# Patient Record
Sex: MALE | Race: BLACK OR AFRICAN AMERICAN | NOT HISPANIC OR LATINO | URBAN - METROPOLITAN AREA
[De-identification: names, ages, dates, MRNs, and addresses within clinical notes are randomized per-mention and may not be internally consistent; named-entity substitution may affect disease eponyms.]

---

## 2023-11-05 ENCOUNTER — INPATIENT (INPATIENT)
Facility: HOSPITAL | Age: 43
LOS: 2 days | Discharge: HOME | DRG: 472 | End: 2023-11-08
Attending: NEUROLOGICAL SURGERY | Admitting: NEUROLOGICAL SURGERY
Payer: COMMERCIAL

## 2023-11-05 VITALS
DIASTOLIC BLOOD PRESSURE: 81 MMHG | RESPIRATION RATE: 18 BRPM | HEART RATE: 78 BPM | TEMPERATURE: 97 F | WEIGHT: 179.9 LBS | SYSTOLIC BLOOD PRESSURE: 136 MMHG | OXYGEN SATURATION: 98 %

## 2023-11-05 DIAGNOSIS — Z98.890 OTHER SPECIFIED POSTPROCEDURAL STATES: Chronic | ICD-10-CM

## 2023-11-05 LAB
ANION GAP SERPL CALC-SCNC: 7 MMOL/L — SIGNIFICANT CHANGE UP (ref 5–17)
ANION GAP SERPL CALC-SCNC: 7 MMOL/L — SIGNIFICANT CHANGE UP (ref 5–17)
APTT BLD: 29.6 SEC — SIGNIFICANT CHANGE UP (ref 24.5–35.6)
APTT BLD: 29.6 SEC — SIGNIFICANT CHANGE UP (ref 24.5–35.6)
BASOPHILS # BLD AUTO: 0.02 K/UL — SIGNIFICANT CHANGE UP (ref 0–0.2)
BASOPHILS # BLD AUTO: 0.02 K/UL — SIGNIFICANT CHANGE UP (ref 0–0.2)
BASOPHILS NFR BLD AUTO: 0.4 % — SIGNIFICANT CHANGE UP (ref 0–2)
BASOPHILS NFR BLD AUTO: 0.4 % — SIGNIFICANT CHANGE UP (ref 0–2)
BLD GP AB SCN SERPL QL: NEGATIVE — SIGNIFICANT CHANGE UP
BLD GP AB SCN SERPL QL: NEGATIVE — SIGNIFICANT CHANGE UP
BUN SERPL-MCNC: 10 MG/DL — SIGNIFICANT CHANGE UP (ref 7–23)
BUN SERPL-MCNC: 10 MG/DL — SIGNIFICANT CHANGE UP (ref 7–23)
CALCIUM SERPL-MCNC: 9.8 MG/DL — SIGNIFICANT CHANGE UP (ref 8.4–10.5)
CALCIUM SERPL-MCNC: 9.8 MG/DL — SIGNIFICANT CHANGE UP (ref 8.4–10.5)
CHLORIDE SERPL-SCNC: 104 MMOL/L — SIGNIFICANT CHANGE UP (ref 96–108)
CHLORIDE SERPL-SCNC: 104 MMOL/L — SIGNIFICANT CHANGE UP (ref 96–108)
CO2 SERPL-SCNC: 29 MMOL/L — SIGNIFICANT CHANGE UP (ref 22–31)
CO2 SERPL-SCNC: 29 MMOL/L — SIGNIFICANT CHANGE UP (ref 22–31)
CREAT SERPL-MCNC: 0.65 MG/DL — SIGNIFICANT CHANGE UP (ref 0.5–1.3)
CREAT SERPL-MCNC: 0.65 MG/DL — SIGNIFICANT CHANGE UP (ref 0.5–1.3)
EGFR: 118 ML/MIN/1.73M2 — SIGNIFICANT CHANGE UP
EGFR: 118 ML/MIN/1.73M2 — SIGNIFICANT CHANGE UP
EOSINOPHIL # BLD AUTO: 0.11 K/UL — SIGNIFICANT CHANGE UP (ref 0–0.5)
EOSINOPHIL # BLD AUTO: 0.11 K/UL — SIGNIFICANT CHANGE UP (ref 0–0.5)
EOSINOPHIL NFR BLD AUTO: 2 % — SIGNIFICANT CHANGE UP (ref 0–6)
EOSINOPHIL NFR BLD AUTO: 2 % — SIGNIFICANT CHANGE UP (ref 0–6)
GLUCOSE SERPL-MCNC: 99 MG/DL — SIGNIFICANT CHANGE UP (ref 70–99)
GLUCOSE SERPL-MCNC: 99 MG/DL — SIGNIFICANT CHANGE UP (ref 70–99)
HCT VFR BLD CALC: 39 % — SIGNIFICANT CHANGE UP (ref 39–50)
HCT VFR BLD CALC: 39 % — SIGNIFICANT CHANGE UP (ref 39–50)
HGB BLD-MCNC: 13.4 G/DL — SIGNIFICANT CHANGE UP (ref 13–17)
HGB BLD-MCNC: 13.4 G/DL — SIGNIFICANT CHANGE UP (ref 13–17)
IMM GRANULOCYTES NFR BLD AUTO: 0.2 % — SIGNIFICANT CHANGE UP (ref 0–0.9)
IMM GRANULOCYTES NFR BLD AUTO: 0.2 % — SIGNIFICANT CHANGE UP (ref 0–0.9)
INR BLD: 1.07 — SIGNIFICANT CHANGE UP (ref 0.85–1.18)
INR BLD: 1.07 — SIGNIFICANT CHANGE UP (ref 0.85–1.18)
LYMPHOCYTES # BLD AUTO: 1.89 K/UL — SIGNIFICANT CHANGE UP (ref 1–3.3)
LYMPHOCYTES # BLD AUTO: 1.89 K/UL — SIGNIFICANT CHANGE UP (ref 1–3.3)
LYMPHOCYTES # BLD AUTO: 35.1 % — SIGNIFICANT CHANGE UP (ref 13–44)
LYMPHOCYTES # BLD AUTO: 35.1 % — SIGNIFICANT CHANGE UP (ref 13–44)
MCHC RBC-ENTMCNC: 29.8 PG — SIGNIFICANT CHANGE UP (ref 27–34)
MCHC RBC-ENTMCNC: 29.8 PG — SIGNIFICANT CHANGE UP (ref 27–34)
MCHC RBC-ENTMCNC: 34.4 GM/DL — SIGNIFICANT CHANGE UP (ref 32–36)
MCHC RBC-ENTMCNC: 34.4 GM/DL — SIGNIFICANT CHANGE UP (ref 32–36)
MCV RBC AUTO: 86.9 FL — SIGNIFICANT CHANGE UP (ref 80–100)
MCV RBC AUTO: 86.9 FL — SIGNIFICANT CHANGE UP (ref 80–100)
MONOCYTES # BLD AUTO: 0.36 K/UL — SIGNIFICANT CHANGE UP (ref 0–0.9)
MONOCYTES # BLD AUTO: 0.36 K/UL — SIGNIFICANT CHANGE UP (ref 0–0.9)
MONOCYTES NFR BLD AUTO: 6.7 % — SIGNIFICANT CHANGE UP (ref 2–14)
MONOCYTES NFR BLD AUTO: 6.7 % — SIGNIFICANT CHANGE UP (ref 2–14)
NEUTROPHILS # BLD AUTO: 3 K/UL — SIGNIFICANT CHANGE UP (ref 1.8–7.4)
NEUTROPHILS # BLD AUTO: 3 K/UL — SIGNIFICANT CHANGE UP (ref 1.8–7.4)
NEUTROPHILS NFR BLD AUTO: 55.6 % — SIGNIFICANT CHANGE UP (ref 43–77)
NEUTROPHILS NFR BLD AUTO: 55.6 % — SIGNIFICANT CHANGE UP (ref 43–77)
NRBC # BLD: 0 /100 WBCS — SIGNIFICANT CHANGE UP (ref 0–0)
NRBC # BLD: 0 /100 WBCS — SIGNIFICANT CHANGE UP (ref 0–0)
PLATELET # BLD AUTO: 208 K/UL — SIGNIFICANT CHANGE UP (ref 150–400)
PLATELET # BLD AUTO: 208 K/UL — SIGNIFICANT CHANGE UP (ref 150–400)
POTASSIUM SERPL-MCNC: 4.2 MMOL/L — SIGNIFICANT CHANGE UP (ref 3.5–5.3)
POTASSIUM SERPL-MCNC: 4.2 MMOL/L — SIGNIFICANT CHANGE UP (ref 3.5–5.3)
POTASSIUM SERPL-SCNC: 4.2 MMOL/L — SIGNIFICANT CHANGE UP (ref 3.5–5.3)
POTASSIUM SERPL-SCNC: 4.2 MMOL/L — SIGNIFICANT CHANGE UP (ref 3.5–5.3)
PROTHROM AB SERPL-ACNC: 12.2 SEC — SIGNIFICANT CHANGE UP (ref 9.5–13)
PROTHROM AB SERPL-ACNC: 12.2 SEC — SIGNIFICANT CHANGE UP (ref 9.5–13)
RBC # BLD: 4.49 M/UL — SIGNIFICANT CHANGE UP (ref 4.2–5.8)
RBC # BLD: 4.49 M/UL — SIGNIFICANT CHANGE UP (ref 4.2–5.8)
RBC # FLD: 11.8 % — SIGNIFICANT CHANGE UP (ref 10.3–14.5)
RBC # FLD: 11.8 % — SIGNIFICANT CHANGE UP (ref 10.3–14.5)
RH IG SCN BLD-IMP: POSITIVE — SIGNIFICANT CHANGE UP
SODIUM SERPL-SCNC: 140 MMOL/L — SIGNIFICANT CHANGE UP (ref 135–145)
SODIUM SERPL-SCNC: 140 MMOL/L — SIGNIFICANT CHANGE UP (ref 135–145)
WBC # BLD: 5.39 K/UL — SIGNIFICANT CHANGE UP (ref 3.8–10.5)
WBC # BLD: 5.39 K/UL — SIGNIFICANT CHANGE UP (ref 3.8–10.5)
WBC # FLD AUTO: 5.39 K/UL — SIGNIFICANT CHANGE UP (ref 3.8–10.5)
WBC # FLD AUTO: 5.39 K/UL — SIGNIFICANT CHANGE UP (ref 3.8–10.5)

## 2023-11-05 PROCEDURE — 99285 EMERGENCY DEPT VISIT HI MDM: CPT

## 2023-11-05 PROCEDURE — 71045 X-RAY EXAM CHEST 1 VIEW: CPT | Mod: 26

## 2023-11-05 PROCEDURE — 93010 ELECTROCARDIOGRAM REPORT: CPT

## 2023-11-05 PROCEDURE — 72040 X-RAY EXAM NECK SPINE 2-3 VW: CPT | Mod: 26

## 2023-11-05 PROCEDURE — 72125 CT NECK SPINE W/O DYE: CPT | Mod: 26

## 2023-11-05 RX ORDER — SENNA PLUS 8.6 MG/1
2 TABLET ORAL AT BEDTIME
Refills: 0 | Status: DISCONTINUED | OUTPATIENT
Start: 2023-11-05 | End: 2023-11-08

## 2023-11-05 RX ORDER — ACETAMINOPHEN 500 MG
650 TABLET ORAL EVERY 6 HOURS
Refills: 0 | Status: DISCONTINUED | OUTPATIENT
Start: 2023-11-05 | End: 2023-11-08

## 2023-11-05 RX ORDER — POLYETHYLENE GLYCOL 3350 17 G/17G
17 POWDER, FOR SOLUTION ORAL DAILY
Refills: 0 | Status: DISCONTINUED | OUTPATIENT
Start: 2023-11-05 | End: 2023-11-08

## 2023-11-05 RX ORDER — METHOCARBAMOL 500 MG/1
500 TABLET, FILM COATED ORAL EVERY 8 HOURS
Refills: 0 | Status: DISCONTINUED | OUTPATIENT
Start: 2023-11-05 | End: 2023-11-08

## 2023-11-05 RX ADMIN — SENNA PLUS 2 TABLET(S): 8.6 TABLET ORAL at 21:56

## 2023-11-05 RX ADMIN — Medication 100 MILLIGRAM(S): at 21:58

## 2023-11-05 RX ADMIN — Medication 100 MILLIGRAM(S): at 13:25

## 2023-11-05 NOTE — ED ADULT NURSE NOTE - NSFALLUNIVINTERV_ED_ALL_ED
Bed/Stretcher in lowest position, wheels locked, appropriate side rails in place/Call bell, personal items and telephone in reach/Instruct patient to call for assistance before getting out of bed/chair/stretcher/Non-slip footwear applied when patient is off stretcher/[place] to call system/Physically safe environment - no spills, clutter or unnecessary equipment/Purposeful proactive rounding/Room/bathroom lighting operational, light cord in reach
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
## 2023-11-05 NOTE — ED PROVIDER NOTE - CLINICAL SUMMARY MEDICAL DECISION MAKING FREE TEXT BOX
44 yo presenting for pre-op, cervical disc herniation? has MRIs with him, sent by Dr. Osborn. Neuro intact. Pre-op labs ordered. NSGY aware.

## 2023-11-05 NOTE — H&P ADULT - NSHPPHYSICALEXAM_GEN_ALL_CORE
General: NAD, pt is comfortably sitting up in bed, on room air  HEENT: CN II-XII grossly intact, PERRL 3mm briskly reactive, EOMI b/l, face symmetric, tongue midline, neck FROM  Cardiovascular: RRR, normal S1 and S2   Respiratory: lungs CTAB, no wheezing, rhonchi, or crackles   GI: normoactive BS to auscultation, abd soft, NTND   Neuro: A&Ox3, No aphasia, speech clear, no dysmetria, no pronator drift. Follows commands.  GUERIN x4 spontaneously, 5/5 strength in all extremities throughout. +numbness to b/l 4th and 5th fingers and b/l toes. RUE and RLE hyperreflexic, LUE and LLE normoreflexic, +b/l Ly sign  Extremities: distal pulses 2+ x4

## 2023-11-05 NOTE — H&P ADULT - NSVTERISKREFERASSESS_GEN_ALL_CORE
Does the Patient have a central line?  Yes: See Invasive (Oncology) Lines Flowsheet for CVAD documentation.      Refer to the Assessment tab to view/cancel completed assessment.

## 2023-11-05 NOTE — ED ADULT TRIAGE NOTE - CHIEF COMPLAINT QUOTE
" I am here because Dr pamela mishra ([phone]) sent me for tests for my operation." hx cervical herniation w difficulty walking. denies any stool/urine incontinence.

## 2023-11-05 NOTE — ED ADULT NURSE NOTE - CHIEF COMPLAINT QUOTE
" I am here because Dr pamela mishra ([phone]) sent me for tests for my operation." hx cervical herniation w difficulty walking. denies any stool/urine incontinence.

## 2023-11-05 NOTE — PATIENT PROFILE ADULT - FALL HARM RISK - HARM RISK INTERVENTIONS
Assistance with ambulation/Assistance OOB with selected safe patient handling equipment/Communicate Risk of Fall with Harm to all staff/Discuss with provider need for PT consult/Monitor gait and stability/Reinforce activity limits and safety measures with patient and family/Tailored Fall Risk Interventions/Visual Cue: Yellow wristband and red socks/Bed in lowest position, wheels locked, appropriate side rails in place/Call bell, personal items and telephone in reach/Instruct patient to call for assistance before getting out of bed or chair/Non-slip footwear when patient is out of bed/[place] to call system/Physically safe environment - no spills, clutter or unnecessary equipment/Purposeful Proactive Rounding/Room/bathroom lighting operational, light cord in reach

## 2023-11-05 NOTE — ED PROVIDER NOTE - OBJECTIVE STATEMENT
43 year old male with ho cervical disc herniation per patient presenting at request of Dr. Osborn for possible pre-op clearance. Pt is visiting from [facility]. Had MRIs done of C and T spine there, has discs with him. Per patient, he called Dr. Osborn and cervical surgery was mentioned. Pt states he was told to come to ER for pre-op, unclear whether he was told he would have surgery now. Endorsing months of difficulty ambulating. No new numbness, weakness, tingling. No worsening pain. ROS as above.

## 2023-11-05 NOTE — ED PROVIDER NOTE - PHYSICAL EXAMINATION
General: Well appearing, in no acute distress  HEENT: Normocephalic, atraumatic, extraocular movements intact  CV: Regular rate  Pulm: No respiratory distress, no tachypnea  Abd: Flat, no gross distension  Ext: warm and well perfused  Skin: No gross rashes or lesions  Neuro: Alert and oriented, moving all extremities, 5/5 motor bilaterally, sensation intact bilaterally, no midline spinal ttp, CN II-XII intact

## 2023-11-05 NOTE — ED ADULT NURSE NOTE - OBJECTIVE STATEMENT
Patient arrived from front triage for pre-op testing as per pt. Pt denies any other medical complaints at this moment. Pt is noted to be aox3, able to maintain airway, having nonlabored breathing, no retractions noted, non diaphoretic and able to talk in clear full sentences. Pt pending provider eval. Patient arrived from front triage for pre-op testing as per pt. Pt reports this is for his neck and spine due to difficulty walking. Pt denies any other medical complaints at this moment. Pt is noted to be aox3, able to maintain airway, having nonlabored breathing, no retractions noted, non diaphoretic and able to talk in clear full sentences. Pt pending provider eval.

## 2023-11-06 ENCOUNTER — TRANSCRIPTION ENCOUNTER (OUTPATIENT)
Age: 43
End: 2023-11-06

## 2023-11-06 PROCEDURE — 99223 1ST HOSP IP/OBS HIGH 75: CPT

## 2023-11-06 RX ORDER — POVIDONE-IODINE 5 %
1 AEROSOL (ML) TOPICAL ONCE
Refills: 0 | Status: COMPLETED | OUTPATIENT
Start: 2023-11-06 | End: 2023-11-07

## 2023-11-06 RX ORDER — CHLORHEXIDINE GLUCONATE 213 G/1000ML
1 SOLUTION TOPICAL ONCE
Refills: 0 | Status: COMPLETED | OUTPATIENT
Start: 2023-11-06 | End: 2023-11-06

## 2023-11-06 RX ORDER — SODIUM CHLORIDE 9 MG/ML
1000 INJECTION INTRAMUSCULAR; INTRAVENOUS; SUBCUTANEOUS
Refills: 0 | Status: DISCONTINUED | OUTPATIENT
Start: 2023-11-06 | End: 2023-11-06

## 2023-11-06 RX ORDER — CHLORHEXIDINE GLUCONATE 213 G/1000ML
1 SOLUTION TOPICAL EVERY 12 HOURS
Refills: 0 | Status: DISCONTINUED | OUTPATIENT
Start: 2023-11-06 | End: 2023-11-06

## 2023-11-06 RX ORDER — ACETAMINOPHEN 500 MG
1000 TABLET ORAL ONCE
Refills: 0 | Status: COMPLETED | OUTPATIENT
Start: 2023-11-07 | End: 2023-11-07

## 2023-11-06 RX ORDER — SODIUM CHLORIDE 9 MG/ML
1000 INJECTION INTRAMUSCULAR; INTRAVENOUS; SUBCUTANEOUS
Refills: 0 | Status: DISCONTINUED | OUTPATIENT
Start: 2023-11-06 | End: 2023-11-07

## 2023-11-06 RX ORDER — APREPITANT 80 MG/1
40 CAPSULE ORAL ONCE
Refills: 0 | Status: COMPLETED | OUTPATIENT
Start: 2023-11-07 | End: 2023-11-07

## 2023-11-06 RX ORDER — POVIDONE-IODINE 5 %
1 AEROSOL (ML) TOPICAL ONCE
Refills: 0 | Status: COMPLETED | OUTPATIENT
Start: 2023-11-06 | End: 2023-11-06

## 2023-11-06 RX ORDER — CHLORHEXIDINE GLUCONATE 213 G/1000ML
1 SOLUTION TOPICAL EVERY 12 HOURS
Refills: 0 | Status: COMPLETED | OUTPATIENT
Start: 2023-11-06 | End: 2023-11-07

## 2023-11-06 RX ADMIN — CHLORHEXIDINE GLUCONATE 1 APPLICATION(S): 213 SOLUTION TOPICAL at 13:52

## 2023-11-06 RX ADMIN — Medication 100 MILLIGRAM(S): at 21:24

## 2023-11-06 RX ADMIN — Medication 1 APPLICATION(S): at 13:52

## 2023-11-06 RX ADMIN — SENNA PLUS 2 TABLET(S): 8.6 TABLET ORAL at 21:24

## 2023-11-06 RX ADMIN — Medication 100 MILLIGRAM(S): at 06:13

## 2023-11-06 RX ADMIN — SODIUM CHLORIDE 80 MILLILITER(S): 9 INJECTION INTRAMUSCULAR; INTRAVENOUS; SUBCUTANEOUS at 07:54

## 2023-11-06 RX ADMIN — Medication 100 MILLIGRAM(S): at 13:28
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
## 2023-11-06 NOTE — CONSULT NOTE ADULT - TIME BILLING
Bedside exam and interview    Review of hospital course, labs, vitals, imaging ,  medical records.   Reviewing outside records   Discharge planning on Interdisciplinary rounds   Discussion with consultants and Primary team   Documenting the encounter.

## 2023-11-06 NOTE — CONSULT NOTE ADULT - ASSESSMENT
Patient is a 43y old  Male who presents with a chief complaint of Cervical radiculopathy and gait instability     Impression and Plan   # Gait instability due to Spinal Stenosis C5-C6- C7 levels  - Plan per NSGY     # Pre-operative Risk Evaluation and Stratificaiton   - RCRI : Class I , 3.9% risk of Julisa op ELIZABETH  - OCONNELL : 0% risk of Julisa Op ELIZABETH  - METS : > 10  - EKG : NSR, No ST-T changes   - PULM RISK : Low Risk   - STOP BAN , Low risk for mod to severe RICCI   - Patient is medically optimized for intermediate risk procedure with above specified risks     # Seizure Disorder , Last Seizure > 8yrs ago   - Continue Dilantin
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
## 2023-11-07 ENCOUNTER — TRANSCRIPTION ENCOUNTER (OUTPATIENT)
Age: 43
End: 2023-11-07

## 2023-11-07 LAB
ANION GAP SERPL CALC-SCNC: 9 MMOL/L — SIGNIFICANT CHANGE UP (ref 5–17)
APTT BLD: 21.9 SEC — LOW (ref 24.5–35.6)
APTT BLD: 21.9 SEC — LOW (ref 24.5–35.6)
BUN SERPL-MCNC: 10 MG/DL — SIGNIFICANT CHANGE UP (ref 7–23)
BUN SERPL-MCNC: 10 MG/DL — SIGNIFICANT CHANGE UP (ref 7–23)
BUN SERPL-MCNC: 14 MG/DL — SIGNIFICANT CHANGE UP (ref 7–23)
BUN SERPL-MCNC: 14 MG/DL — SIGNIFICANT CHANGE UP (ref 7–23)
CALCIUM SERPL-MCNC: 9.3 MG/DL — SIGNIFICANT CHANGE UP (ref 8.4–10.5)
CHLORIDE SERPL-SCNC: 103 MMOL/L — SIGNIFICANT CHANGE UP (ref 96–108)
CHLORIDE SERPL-SCNC: 103 MMOL/L — SIGNIFICANT CHANGE UP (ref 96–108)
CHLORIDE SERPL-SCNC: 107 MMOL/L — SIGNIFICANT CHANGE UP (ref 96–108)
CHLORIDE SERPL-SCNC: 107 MMOL/L — SIGNIFICANT CHANGE UP (ref 96–108)
CO2 SERPL-SCNC: 24 MMOL/L — SIGNIFICANT CHANGE UP (ref 22–31)
CO2 SERPL-SCNC: 24 MMOL/L — SIGNIFICANT CHANGE UP (ref 22–31)
CO2 SERPL-SCNC: 26 MMOL/L — SIGNIFICANT CHANGE UP (ref 22–31)
CO2 SERPL-SCNC: 26 MMOL/L — SIGNIFICANT CHANGE UP (ref 22–31)
CREAT SERPL-MCNC: 0.64 MG/DL — SIGNIFICANT CHANGE UP (ref 0.5–1.3)
CREAT SERPL-MCNC: 0.64 MG/DL — SIGNIFICANT CHANGE UP (ref 0.5–1.3)
CREAT SERPL-MCNC: 0.67 MG/DL — SIGNIFICANT CHANGE UP (ref 0.5–1.3)
CREAT SERPL-MCNC: 0.67 MG/DL — SIGNIFICANT CHANGE UP (ref 0.5–1.3)
EGFR: 117 ML/MIN/1.73M2 — SIGNIFICANT CHANGE UP
EGFR: 117 ML/MIN/1.73M2 — SIGNIFICANT CHANGE UP
EGFR: 119 ML/MIN/1.73M2 — SIGNIFICANT CHANGE UP
EGFR: 119 ML/MIN/1.73M2 — SIGNIFICANT CHANGE UP
GLUCOSE BLDC GLUCOMTR-MCNC: 108 MG/DL — HIGH (ref 70–99)
GLUCOSE BLDC GLUCOMTR-MCNC: 108 MG/DL — HIGH (ref 70–99)
GLUCOSE SERPL-MCNC: 119 MG/DL — HIGH (ref 70–99)
GLUCOSE SERPL-MCNC: 119 MG/DL — HIGH (ref 70–99)
GLUCOSE SERPL-MCNC: 122 MG/DL — HIGH (ref 70–99)
GLUCOSE SERPL-MCNC: 122 MG/DL — HIGH (ref 70–99)
HCT VFR BLD CALC: 37.5 % — LOW (ref 39–50)
HCT VFR BLD CALC: 37.5 % — LOW (ref 39–50)
HCT VFR BLD CALC: 40.6 % — SIGNIFICANT CHANGE UP (ref 39–50)
HCT VFR BLD CALC: 40.6 % — SIGNIFICANT CHANGE UP (ref 39–50)
HGB BLD-MCNC: 13 G/DL — SIGNIFICANT CHANGE UP (ref 13–17)
HGB BLD-MCNC: 13 G/DL — SIGNIFICANT CHANGE UP (ref 13–17)
HGB BLD-MCNC: 13.7 G/DL — SIGNIFICANT CHANGE UP (ref 13–17)
HGB BLD-MCNC: 13.7 G/DL — SIGNIFICANT CHANGE UP (ref 13–17)
INR BLD: 0.98 — SIGNIFICANT CHANGE UP (ref 0.85–1.18)
INR BLD: 0.98 — SIGNIFICANT CHANGE UP (ref 0.85–1.18)
MAGNESIUM SERPL-MCNC: 2 MG/DL — SIGNIFICANT CHANGE UP (ref 1.6–2.6)
MAGNESIUM SERPL-MCNC: 2 MG/DL — SIGNIFICANT CHANGE UP (ref 1.6–2.6)
MCHC RBC-ENTMCNC: 29.5 PG — SIGNIFICANT CHANGE UP (ref 27–34)
MCHC RBC-ENTMCNC: 33.7 GM/DL — SIGNIFICANT CHANGE UP (ref 32–36)
MCHC RBC-ENTMCNC: 33.7 GM/DL — SIGNIFICANT CHANGE UP (ref 32–36)
MCHC RBC-ENTMCNC: 34.7 GM/DL — SIGNIFICANT CHANGE UP (ref 32–36)
MCHC RBC-ENTMCNC: 34.7 GM/DL — SIGNIFICANT CHANGE UP (ref 32–36)
MCV RBC AUTO: 85 FL — SIGNIFICANT CHANGE UP (ref 80–100)
MCV RBC AUTO: 85 FL — SIGNIFICANT CHANGE UP (ref 80–100)
MCV RBC AUTO: 87.3 FL — SIGNIFICANT CHANGE UP (ref 80–100)
MCV RBC AUTO: 87.3 FL — SIGNIFICANT CHANGE UP (ref 80–100)
NRBC # BLD: 0 /100 WBCS — SIGNIFICANT CHANGE UP (ref 0–0)
PHOSPHATE SERPL-MCNC: 3.5 MG/DL — SIGNIFICANT CHANGE UP (ref 2.5–4.5)
PHOSPHATE SERPL-MCNC: 3.5 MG/DL — SIGNIFICANT CHANGE UP (ref 2.5–4.5)
PLATELET # BLD AUTO: 161 K/UL — SIGNIFICANT CHANGE UP (ref 150–400)
PLATELET # BLD AUTO: 161 K/UL — SIGNIFICANT CHANGE UP (ref 150–400)
PLATELET # BLD AUTO: 209 K/UL — SIGNIFICANT CHANGE UP (ref 150–400)
PLATELET # BLD AUTO: 209 K/UL — SIGNIFICANT CHANGE UP (ref 150–400)
POTASSIUM SERPL-MCNC: 4.3 MMOL/L — SIGNIFICANT CHANGE UP (ref 3.5–5.3)
POTASSIUM SERPL-MCNC: 4.3 MMOL/L — SIGNIFICANT CHANGE UP (ref 3.5–5.3)
POTASSIUM SERPL-MCNC: 4.4 MMOL/L — SIGNIFICANT CHANGE UP (ref 3.5–5.3)
POTASSIUM SERPL-MCNC: 4.4 MMOL/L — SIGNIFICANT CHANGE UP (ref 3.5–5.3)
POTASSIUM SERPL-SCNC: 4.3 MMOL/L — SIGNIFICANT CHANGE UP (ref 3.5–5.3)
POTASSIUM SERPL-SCNC: 4.3 MMOL/L — SIGNIFICANT CHANGE UP (ref 3.5–5.3)
POTASSIUM SERPL-SCNC: 4.4 MMOL/L — SIGNIFICANT CHANGE UP (ref 3.5–5.3)
POTASSIUM SERPL-SCNC: 4.4 MMOL/L — SIGNIFICANT CHANGE UP (ref 3.5–5.3)
PROTHROM AB SERPL-ACNC: 11.2 SEC — SIGNIFICANT CHANGE UP (ref 9.5–13)
PROTHROM AB SERPL-ACNC: 11.2 SEC — SIGNIFICANT CHANGE UP (ref 9.5–13)
RBC # BLD: 4.41 M/UL — SIGNIFICANT CHANGE UP (ref 4.2–5.8)
RBC # BLD: 4.41 M/UL — SIGNIFICANT CHANGE UP (ref 4.2–5.8)
RBC # BLD: 4.65 M/UL — SIGNIFICANT CHANGE UP (ref 4.2–5.8)
RBC # BLD: 4.65 M/UL — SIGNIFICANT CHANGE UP (ref 4.2–5.8)
RBC # FLD: 11.6 % — SIGNIFICANT CHANGE UP (ref 10.3–14.5)
RBC # FLD: 11.6 % — SIGNIFICANT CHANGE UP (ref 10.3–14.5)
RBC # FLD: 11.8 % — SIGNIFICANT CHANGE UP (ref 10.3–14.5)
RBC # FLD: 11.8 % — SIGNIFICANT CHANGE UP (ref 10.3–14.5)
SODIUM SERPL-SCNC: 136 MMOL/L — SIGNIFICANT CHANGE UP (ref 135–145)
SODIUM SERPL-SCNC: 136 MMOL/L — SIGNIFICANT CHANGE UP (ref 135–145)
SODIUM SERPL-SCNC: 142 MMOL/L — SIGNIFICANT CHANGE UP (ref 135–145)
SODIUM SERPL-SCNC: 142 MMOL/L — SIGNIFICANT CHANGE UP (ref 135–145)
WBC # BLD: 6.05 K/UL — SIGNIFICANT CHANGE UP (ref 3.8–10.5)
WBC # BLD: 6.05 K/UL — SIGNIFICANT CHANGE UP (ref 3.8–10.5)
WBC # BLD: 6.59 K/UL — SIGNIFICANT CHANGE UP (ref 3.8–10.5)
WBC # BLD: 6.59 K/UL — SIGNIFICANT CHANGE UP (ref 3.8–10.5)
WBC # FLD AUTO: 6.05 K/UL — SIGNIFICANT CHANGE UP (ref 3.8–10.5)
WBC # FLD AUTO: 6.05 K/UL — SIGNIFICANT CHANGE UP (ref 3.8–10.5)
WBC # FLD AUTO: 6.59 K/UL — SIGNIFICANT CHANGE UP (ref 3.8–10.5)
WBC # FLD AUTO: 6.59 K/UL — SIGNIFICANT CHANGE UP (ref 3.8–10.5)

## 2023-11-07 PROCEDURE — 22554 ARTHRD ANT NTRBD MIN DSC CRV: CPT

## 2023-11-07 PROCEDURE — 99232 SBSQ HOSP IP/OBS MODERATE 35: CPT

## 2023-11-07 PROCEDURE — 22585 ARTHRD ANT NTRBD MIN DSC EA: CPT | Mod: 59

## 2023-11-07 PROCEDURE — 22854 INSJ BIOMECHANICAL DEVICE: CPT

## 2023-11-07 PROCEDURE — 22845 INSERT SPINE FIXATION DEVICE: CPT | Mod: 59

## 2023-11-07 PROCEDURE — 63081 REMOVE VERT BODY DCMPRN CRVL: CPT | Mod: 22

## 2023-11-07 DEVICE — SCREW VAR SELF START 4.35X18MM: Type: IMPLANTABLE DEVICE | Status: FUNCTIONAL

## 2023-11-07 DEVICE — PLATE OZARK 2 LVL 40MM: Type: IMPLANTABLE DEVICE | Status: FUNCTIONAL

## 2023-11-07 DEVICE — DISTRACTION PIN 14MM (YELLOW): Type: IMPLANTABLE DEVICE | Status: FUNCTIONAL

## 2023-11-07 DEVICE — FLOSEAL NT 5ML: Type: IMPLANTABLE DEVICE | Status: FUNCTIONAL

## 2023-11-07 DEVICE — IMPLANTABLE DEVICE: Type: IMPLANTABLE DEVICE | Status: FUNCTIONAL

## 2023-11-07 RX ORDER — ONDANSETRON 8 MG/1
4 TABLET, FILM COATED ORAL EVERY 6 HOURS
Refills: 0 | Status: DISCONTINUED | OUTPATIENT
Start: 2023-11-07 | End: 2023-11-08

## 2023-11-07 RX ORDER — SODIUM CHLORIDE 9 MG/ML
1000 INJECTION, SOLUTION INTRAVENOUS
Refills: 0 | Status: DISCONTINUED | OUTPATIENT
Start: 2023-11-07 | End: 2023-11-08

## 2023-11-07 RX ORDER — MAGNESIUM HYDROXIDE 400 MG/1
30 TABLET, CHEWABLE ORAL EVERY 12 HOURS
Refills: 0 | Status: DISCONTINUED | OUTPATIENT
Start: 2023-11-07 | End: 2023-11-08

## 2023-11-07 RX ORDER — CEFAZOLIN SODIUM 1 G
2000 VIAL (EA) INJECTION EVERY 8 HOURS
Refills: 0 | Status: COMPLETED | OUTPATIENT
Start: 2023-11-07 | End: 2023-11-08

## 2023-11-07 RX ORDER — OXYCODONE HYDROCHLORIDE 5 MG/1
5 TABLET ORAL EVERY 4 HOURS
Refills: 0 | Status: DISCONTINUED | OUTPATIENT
Start: 2023-11-07 | End: 2023-11-08

## 2023-11-07 RX ADMIN — SODIUM CHLORIDE 80 MILLILITER(S): 9 INJECTION INTRAMUSCULAR; INTRAVENOUS; SUBCUTANEOUS at 00:08

## 2023-11-07 RX ADMIN — Medication 100 MILLIGRAM(S): at 21:41

## 2023-11-07 RX ADMIN — Medication 50 MILLIGRAM(S): at 21:41

## 2023-11-07 RX ADMIN — Medication 1 APPLICATION(S): at 09:45

## 2023-11-07 RX ADMIN — APREPITANT 40 MILLIGRAM(S): 80 CAPSULE ORAL at 09:39

## 2023-11-07 RX ADMIN — CHLORHEXIDINE GLUCONATE 1 APPLICATION(S): 213 SOLUTION TOPICAL at 05:32

## 2023-11-07 RX ADMIN — Medication 1000 MILLIGRAM(S): at 10:05

## 2023-11-07 RX ADMIN — SODIUM CHLORIDE 75 MILLILITER(S): 9 INJECTION, SOLUTION INTRAVENOUS at 16:14

## 2023-11-07 RX ADMIN — Medication 1000 MILLIGRAM(S): at 09:39

## 2023-11-07 RX ADMIN — Medication 100 MILLIGRAM(S): at 16:14

## 2023-11-07 RX ADMIN — Medication 100 MILLIGRAM(S): at 05:32

## 2023-11-07 NOTE — OCCUPATIONAL THERAPY INITIAL EVALUATION ADULT - LIVES WITH, PROFILE
Pt lives alone, however will be staying with his friend in private house in [place]. Pt at baseline is ind for ADLs and functional mobility. No DME needed. R handed/alone
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
## 2023-11-07 NOTE — PROGRESS NOTE ADULT - SUBJECTIVE AND OBJECTIVE BOX
Patient is a 43y old  Male who presents with a chief complaint of Cervical radiculopathy (06 Nov 2023 09:04)        SUBJECTIVE:  Patient was seen and examined at bedside.    Overnight Events :  resting in bed , no new complaints , awaiting surgery       Review of systems: 12 point Review of systems negative unless otherwise documented elsewhere in note.     Diet, Regular (11-07-23 @ 12:35) [Available for Activation]  Diet, Clear Liquid (11-07-23 @ 12:35) [Available for Activation]  Diet, NPO (11-07-23 @ 12:35) [Active]  Diet, NPO after Midnight:      NPO Start Date: 06-Nov-2023,   NPO Start Time: 23:59  Except Medications (11-06-23 @ 19:15) [Active]      MEDICATIONS:  MEDICATIONS  (STANDING):  ceFAZolin   IVPB 2000 milliGRAM(s) IV Intermittent every 8 hours  chlorhexidine 2% Cloths 1 Application(s) Topical every 12 hours  lactated ringers. 1000 milliLiter(s) (75 mL/Hr) IV Continuous <Continuous>  multivitamin 1 Tablet(s) Oral daily  ondansetron   Disintegrating Tablet 4 milliGRAM(s) Oral every 6 hours  phenytoin   Capsule 100 milliGRAM(s) Oral three times a day  polyethylene glycol 3350 17 Gram(s) Oral daily  pregabalin 50 milliGRAM(s) Oral three times a day  senna 2 Tablet(s) Oral at bedtime    MEDICATIONS  (PRN):  acetaminophen     Tablet .. 650 milliGRAM(s) Oral every 6 hours PRN Mild Pain (1 - 3)  bisacodyl 5 milliGRAM(s) Oral every 12 hours PRN Constipation  magnesium hydroxide Suspension 30 milliLiter(s) Oral every 12 hours PRN Constipation  methocarbamol 500 milliGRAM(s) Oral every 8 hours PRN Muscle Spasm  oxyCODONE    IR 5 milliGRAM(s) Oral every 4 hours PRN Severe Pain (7 - 10)      Allergies    No Known Allergies    Intolerances        OBJECTIVE:  Vital Signs Last 24 Hrs  T(C): 36.7 (07 Nov 2023 11:40), Max: 36.8 (06 Nov 2023 16:02)  T(F): 98.1 (07 Nov 2023 11:38), Max: 98.2 (06 Nov 2023 16:02)  HR: 70 (07 Nov 2023 11:40) (70 - 80)  BP: 126/75 (07 Nov 2023 11:40) (102/70 - 126/75)  BP(mean): 69 (07 Nov 2023 11:40) (69 - 69)  RR: 16 (07 Nov 2023 11:40) (16 - 17)  SpO2: 99% (07 Nov 2023 11:40) (97% - 99%)    Parameters below as of 07 Nov 2023 11:38  Patient On (Oxygen Delivery Method): room air      I&O's Summary      PHYSICAL EXAM:  Gen: Resting in bed at time of exam, not in distress   HEENT: moist mucosa, no lesions   Neck: supple, trachea at midline  CV: RRR, +S1/S2  Pulm: no wheezing , no crackles  no increase in work of breathing  Abd: soft, NTND  Skin: warm and dry, no new rashes   Ext: moving all 4 extremities spontaneously , no edema  ,  Neuro: AOx3, no gross focal neurological deficits  Psych: affect and behavior appropriate, pleasant at time of interview    LABS:                        13.7   6.59  )-----------( 161      ( 07 Nov 2023 05:05 )             40.6     11-07    136  |  103  |  14  ----------------------------<  119<H>  4.3   |  24  |  0.67    Ca    9.3      07 Nov 2023 05:05  Phos  3.5     11-07  Mg     2.0     11-07        PT/INR - ( 07 Nov 2023 05:05 )   PT: 11.2 sec;   INR: 0.98          PTT - ( 07 Nov 2023 05:05 )  PTT:21.9 sec  CAPILLARY BLOOD GLUCOSE        Urinalysis Basic - ( 07 Nov 2023 05:05 )    Color: x / Appearance: x / SG: x / pH: x  Gluc: 119 mg/dL / Ketone: x  / Bili: x / Urobili: x   Blood: x / Protein: x / Nitrite: x   Leuk Esterase: x / RBC: x / WBC x   Sq Epi: x / Non Sq Epi: x / Bacteria: x        MICRODATA:      RADIOLOGY/OTHER STUDIES:
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
## 2023-11-07 NOTE — BRIEF OPERATIVE NOTE - NSICDXBRIEFPROCEDURE_GEN_ALL_CORE_FT
PROCEDURES:  Anterior cervical discectomy and fusion (ACDF) at 2 levels 07-Nov-2023 12:26:53  Cuco Nunes

## 2023-11-07 NOTE — OCCUPATIONAL THERAPY INITIAL EVALUATION ADULT - DIAGNOSIS, OT EVAL
Pt presents with chronic numbness on B digit demonstrating slight decrease in balance possibly due to POD 0.

## 2023-11-08 ENCOUNTER — TRANSCRIPTION ENCOUNTER (OUTPATIENT)
Age: 43
End: 2023-11-08

## 2023-11-08 VITALS
HEART RATE: 91 BPM | SYSTOLIC BLOOD PRESSURE: 128 MMHG | TEMPERATURE: 98 F | DIASTOLIC BLOOD PRESSURE: 74 MMHG | RESPIRATION RATE: 16 BRPM | OXYGEN SATURATION: 98 %

## 2023-11-08 PROBLEM — Z00.00 ENCOUNTER FOR PREVENTIVE HEALTH EXAMINATION: Status: ACTIVE | Noted: 2023-11-08

## 2023-11-08 PROBLEM — G40.909 EPILEPSY, UNSPECIFIED, NOT INTRACTABLE, WITHOUT STATUS EPILEPTICUS: Chronic | Status: ACTIVE | Noted: 2023-11-05

## 2023-11-08 LAB
ANION GAP SERPL CALC-SCNC: 11 MMOL/L — SIGNIFICANT CHANGE UP (ref 5–17)
ANION GAP SERPL CALC-SCNC: 11 MMOL/L — SIGNIFICANT CHANGE UP (ref 5–17)
BASOPHILS # BLD AUTO: 0.02 K/UL — SIGNIFICANT CHANGE UP (ref 0–0.2)
BASOPHILS # BLD AUTO: 0.02 K/UL — SIGNIFICANT CHANGE UP (ref 0–0.2)
BASOPHILS NFR BLD AUTO: 0.2 % — SIGNIFICANT CHANGE UP (ref 0–2)
BASOPHILS NFR BLD AUTO: 0.2 % — SIGNIFICANT CHANGE UP (ref 0–2)
BUN SERPL-MCNC: 12 MG/DL — SIGNIFICANT CHANGE UP (ref 7–23)
BUN SERPL-MCNC: 12 MG/DL — SIGNIFICANT CHANGE UP (ref 7–23)
CALCIUM SERPL-MCNC: 9.6 MG/DL — SIGNIFICANT CHANGE UP (ref 8.4–10.5)
CALCIUM SERPL-MCNC: 9.6 MG/DL — SIGNIFICANT CHANGE UP (ref 8.4–10.5)
CHLORIDE SERPL-SCNC: 102 MMOL/L — SIGNIFICANT CHANGE UP (ref 96–108)
CHLORIDE SERPL-SCNC: 102 MMOL/L — SIGNIFICANT CHANGE UP (ref 96–108)
CO2 SERPL-SCNC: 27 MMOL/L — SIGNIFICANT CHANGE UP (ref 22–31)
CO2 SERPL-SCNC: 27 MMOL/L — SIGNIFICANT CHANGE UP (ref 22–31)
CREAT SERPL-MCNC: 0.62 MG/DL — SIGNIFICANT CHANGE UP (ref 0.5–1.3)
CREAT SERPL-MCNC: 0.62 MG/DL — SIGNIFICANT CHANGE UP (ref 0.5–1.3)
EGFR: 120 ML/MIN/1.73M2 — SIGNIFICANT CHANGE UP
EGFR: 120 ML/MIN/1.73M2 — SIGNIFICANT CHANGE UP
EOSINOPHIL # BLD AUTO: 0.05 K/UL — SIGNIFICANT CHANGE UP (ref 0–0.5)
EOSINOPHIL # BLD AUTO: 0.05 K/UL — SIGNIFICANT CHANGE UP (ref 0–0.5)
EOSINOPHIL NFR BLD AUTO: 0.4 % — SIGNIFICANT CHANGE UP (ref 0–6)
EOSINOPHIL NFR BLD AUTO: 0.4 % — SIGNIFICANT CHANGE UP (ref 0–6)
GLUCOSE SERPL-MCNC: 103 MG/DL — HIGH (ref 70–99)
GLUCOSE SERPL-MCNC: 103 MG/DL — HIGH (ref 70–99)
HCT VFR BLD CALC: 38.3 % — LOW (ref 39–50)
HCT VFR BLD CALC: 38.3 % — LOW (ref 39–50)
HGB BLD-MCNC: 12.9 G/DL — LOW (ref 13–17)
HGB BLD-MCNC: 12.9 G/DL — LOW (ref 13–17)
IMM GRANULOCYTES NFR BLD AUTO: 0.4 % — SIGNIFICANT CHANGE UP (ref 0–0.9)
IMM GRANULOCYTES NFR BLD AUTO: 0.4 % — SIGNIFICANT CHANGE UP (ref 0–0.9)
LYMPHOCYTES # BLD AUTO: 2.85 K/UL — SIGNIFICANT CHANGE UP (ref 1–3.3)
LYMPHOCYTES # BLD AUTO: 2.85 K/UL — SIGNIFICANT CHANGE UP (ref 1–3.3)
LYMPHOCYTES # BLD AUTO: 23.4 % — SIGNIFICANT CHANGE UP (ref 13–44)
LYMPHOCYTES # BLD AUTO: 23.4 % — SIGNIFICANT CHANGE UP (ref 13–44)
MCHC RBC-ENTMCNC: 29.6 PG — SIGNIFICANT CHANGE UP (ref 27–34)
MCHC RBC-ENTMCNC: 29.6 PG — SIGNIFICANT CHANGE UP (ref 27–34)
MCHC RBC-ENTMCNC: 33.7 GM/DL — SIGNIFICANT CHANGE UP (ref 32–36)
MCHC RBC-ENTMCNC: 33.7 GM/DL — SIGNIFICANT CHANGE UP (ref 32–36)
MCV RBC AUTO: 87.8 FL — SIGNIFICANT CHANGE UP (ref 80–100)
MCV RBC AUTO: 87.8 FL — SIGNIFICANT CHANGE UP (ref 80–100)
MONOCYTES # BLD AUTO: 0.88 K/UL — SIGNIFICANT CHANGE UP (ref 0–0.9)
MONOCYTES # BLD AUTO: 0.88 K/UL — SIGNIFICANT CHANGE UP (ref 0–0.9)
MONOCYTES NFR BLD AUTO: 7.2 % — SIGNIFICANT CHANGE UP (ref 2–14)
MONOCYTES NFR BLD AUTO: 7.2 % — SIGNIFICANT CHANGE UP (ref 2–14)
NEUTROPHILS # BLD AUTO: 8.34 K/UL — HIGH (ref 1.8–7.4)
NEUTROPHILS # BLD AUTO: 8.34 K/UL — HIGH (ref 1.8–7.4)
NEUTROPHILS NFR BLD AUTO: 68.4 % — SIGNIFICANT CHANGE UP (ref 43–77)
NEUTROPHILS NFR BLD AUTO: 68.4 % — SIGNIFICANT CHANGE UP (ref 43–77)
NRBC # BLD: 0 /100 WBCS — SIGNIFICANT CHANGE UP (ref 0–0)
NRBC # BLD: 0 /100 WBCS — SIGNIFICANT CHANGE UP (ref 0–0)
PLATELET # BLD AUTO: 184 K/UL — SIGNIFICANT CHANGE UP (ref 150–400)
PLATELET # BLD AUTO: 184 K/UL — SIGNIFICANT CHANGE UP (ref 150–400)
POTASSIUM SERPL-MCNC: 4.4 MMOL/L — SIGNIFICANT CHANGE UP (ref 3.5–5.3)
POTASSIUM SERPL-MCNC: 4.4 MMOL/L — SIGNIFICANT CHANGE UP (ref 3.5–5.3)
POTASSIUM SERPL-SCNC: 4.4 MMOL/L — SIGNIFICANT CHANGE UP (ref 3.5–5.3)
POTASSIUM SERPL-SCNC: 4.4 MMOL/L — SIGNIFICANT CHANGE UP (ref 3.5–5.3)
RBC # BLD: 4.36 M/UL — SIGNIFICANT CHANGE UP (ref 4.2–5.8)
RBC # BLD: 4.36 M/UL — SIGNIFICANT CHANGE UP (ref 4.2–5.8)
RBC # FLD: 11.8 % — SIGNIFICANT CHANGE UP (ref 10.3–14.5)
RBC # FLD: 11.8 % — SIGNIFICANT CHANGE UP (ref 10.3–14.5)
SODIUM SERPL-SCNC: 140 MMOL/L — SIGNIFICANT CHANGE UP (ref 135–145)
SODIUM SERPL-SCNC: 140 MMOL/L — SIGNIFICANT CHANGE UP (ref 135–145)
WBC # BLD: 12.19 K/UL — HIGH (ref 3.8–10.5)
WBC # BLD: 12.19 K/UL — HIGH (ref 3.8–10.5)
WBC # FLD AUTO: 12.19 K/UL — HIGH (ref 3.8–10.5)
WBC # FLD AUTO: 12.19 K/UL — HIGH (ref 3.8–10.5)

## 2023-11-08 PROCEDURE — 84100 ASSAY OF PHOSPHORUS: CPT

## 2023-11-08 PROCEDURE — 86901 BLOOD TYPING SEROLOGIC RH(D): CPT

## 2023-11-08 PROCEDURE — 93005 ELECTROCARDIOGRAM TRACING: CPT

## 2023-11-08 PROCEDURE — 72125 CT NECK SPINE W/O DYE: CPT | Mod: MA

## 2023-11-08 PROCEDURE — 72040 X-RAY EXAM NECK SPINE 2-3 VW: CPT | Mod: 26

## 2023-11-08 PROCEDURE — 76000 FLUOROSCOPY <1 HR PHYS/QHP: CPT

## 2023-11-08 PROCEDURE — 99231 SBSQ HOSP IP/OBS SF/LOW 25: CPT

## 2023-11-08 PROCEDURE — 97161 PT EVAL LOW COMPLEX 20 MIN: CPT

## 2023-11-08 PROCEDURE — 72040 X-RAY EXAM NECK SPINE 2-3 VW: CPT

## 2023-11-08 PROCEDURE — C1713: CPT

## 2023-11-08 PROCEDURE — 71045 X-RAY EXAM CHEST 1 VIEW: CPT

## 2023-11-08 PROCEDURE — 86850 RBC ANTIBODY SCREEN: CPT

## 2023-11-08 PROCEDURE — 82962 GLUCOSE BLOOD TEST: CPT

## 2023-11-08 PROCEDURE — C1889: CPT

## 2023-11-08 PROCEDURE — 86900 BLOOD TYPING SEROLOGIC ABO: CPT

## 2023-11-08 PROCEDURE — 80048 BASIC METABOLIC PNL TOTAL CA: CPT

## 2023-11-08 PROCEDURE — 99285 EMERGENCY DEPT VISIT HI MDM: CPT | Mod: 25

## 2023-11-08 PROCEDURE — 97165 OT EVAL LOW COMPLEX 30 MIN: CPT

## 2023-11-08 PROCEDURE — 85027 COMPLETE CBC AUTOMATED: CPT

## 2023-11-08 PROCEDURE — 83735 ASSAY OF MAGNESIUM: CPT

## 2023-11-08 PROCEDURE — 85610 PROTHROMBIN TIME: CPT

## 2023-11-08 PROCEDURE — 36415 COLL VENOUS BLD VENIPUNCTURE: CPT

## 2023-11-08 PROCEDURE — 85730 THROMBOPLASTIN TIME PARTIAL: CPT

## 2023-11-08 PROCEDURE — 85025 COMPLETE CBC W/AUTO DIFF WBC: CPT

## 2023-11-08 PROCEDURE — 97535 SELF CARE MNGMENT TRAINING: CPT

## 2023-11-08 PROCEDURE — 97110 THERAPEUTIC EXERCISES: CPT

## 2023-11-08 RX ORDER — POLYETHYLENE GLYCOL 3350 17 G/17G
17 POWDER, FOR SOLUTION ORAL
Qty: 238 | Refills: 0
Start: 2023-11-08 | End: 2023-11-21

## 2023-11-08 RX ORDER — ACETAMINOPHEN 500 MG
2 TABLET ORAL
Qty: 0 | Refills: 0 | DISCHARGE
Start: 2023-11-08

## 2023-11-08 RX ORDER — METHOCARBAMOL 500 MG/1
1 TABLET, FILM COATED ORAL
Qty: 21 | Refills: 0
Start: 2023-11-08 | End: 2023-11-14

## 2023-11-08 RX ORDER — OXYCODONE HYDROCHLORIDE 5 MG/1
1 TABLET ORAL
Qty: 28 | Refills: 0
Start: 2023-11-08 | End: 2023-11-14

## 2023-11-08 RX ORDER — NALOXONE HYDROCHLORIDE 4 MG/.1ML
4 SPRAY NASAL
Qty: 1 | Refills: 0
Start: 2023-11-08 | End: 2023-11-08

## 2023-11-08 RX ADMIN — Medication 100 MILLIGRAM(S): at 05:31

## 2023-11-08 RX ADMIN — Medication 50 MILLIGRAM(S): at 15:51

## 2023-11-08 RX ADMIN — Medication 100 MILLIGRAM(S): at 15:51

## 2023-11-08 RX ADMIN — Medication 50 MILLIGRAM(S): at 05:31

## 2023-11-08 RX ADMIN — Medication 1 TABLET(S): at 11:17

## 2023-11-08 RX ADMIN — POLYETHYLENE GLYCOL 3350 17 GRAM(S): 17 POWDER, FOR SOLUTION ORAL at 11:17

## 2023-11-08 NOTE — DISCHARGE NOTE PROVIDER - CARE PROVIDER_API CALL
Cale Mares  Neurosurgery  [address], [address] [place] [address]  Phone: [phone]  Fax: [phone]  Follow Up Time:     Eloy Noel  Pain Medicine  [address] [place] [address], [address]  [place] [address]  Phone: [phone]  Fax: [phone]  Follow Up Time:
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
## 2023-11-08 NOTE — DISCHARGE NOTE NURSING/CASE MANAGEMENT/SOCIAL WORK - PATIENT PORTAL LINK FT
You can access the FollowMyHealth Patient Portal offered by [facility] by registering at the following website: [email or web address]. By joining [facility]’s FollowMyHealth portal, you will also be able to view your health information using other applications (apps) compatible with our system.

## 2023-11-08 NOTE — DISCHARGE NOTE PROVIDER - NSDCFUADDAPPT_GEN_ALL_CORE_FT
Please follow up with Dr. Mares on 11/22 at 10AM, call the office to make/confirm appointment at [phone]    Please follow up with Dr. Noel if needed for pain management    Please follow up with your primary care doctor

## 2023-11-08 NOTE — PROGRESS NOTE ADULT - ASSESSMENT
Patient is a 43y old  Male who presents with a chief complaint of Cervical radiculopathy and gait instability     Impression and Plan   # Gait instability due to Spinal Stenosis C5-C6- C7 levels  -  S/p ACDF of C5-C7 on11/7      # Seizure Disorder , Last Seizure > 8yrs ago   - Continue Dilantin     # Dispo : Home today 
Patient is a 43y old  Male who presents with a chief complaint of Cervical radiculopathy and gait instability     Impression and Plan   # Gait instability due to Spinal Stenosis C5-C6- C7 levels  - Plan per NSGY     # Pre-operative Risk Evaluation and Stratificaiton   - RCRI : Class I , 3.9% risk of Julisa op ELIZABETH  - OCONNELL : 0% risk of Julisa Op ELIZABETH  - METS : > 10  - EKG : NSR, No ST-T changes   - PULM RISK : Low Risk   - STOP BAN , Low risk for mod to severe RICCI   - Patient is medically optimized for intermediate risk procedure with above specified risks     # Seizure Disorder , Last Seizure > 8yrs ago   - Continue Dilantin

## 2023-11-08 NOTE — DISCHARGE NOTE NURSING/CASE MANAGEMENT/SOCIAL WORK - NSDCPEFALRISK_GEN_ALL_CORE
For information on Fall & Injury Prevention, visit: [email or web address] OR  [email or web address] OR  https://www.cdc.gov/steadi/patient.html

## 2023-11-08 NOTE — DISCHARGE NOTE PROVIDER - NSDCFUSCHEDAPPT_GEN_ALL_CORE_FT
Cale Mares  [facility]  [facility] [address]  Scheduled Appointment: 11/22/2023

## 2023-11-08 NOTE — PROCEDURE NOTE - ESTIMATED BLOOD LOSS
None [General Appearance - Well Developed] : well developed [Normal Appearance] : normal appearance [Well Groomed] : well groomed [General Appearance - Well Nourished] : well nourished [No Deformities] : no deformities [General Appearance - In No Acute Distress] : no acute distress [Normal Conjunctiva] : the conjunctiva exhibited no abnormalities [Eyelids - No Xanthelasma] : the eyelids demonstrated no xanthelasmas [Normal Oral Mucosa] : normal oral mucosa [No Oral Pallor] : no oral pallor [No Oral Cyanosis] : no oral cyanosis [Normal Jugular Venous A Waves Present] : normal jugular venous A waves present [Normal Jugular Venous V Waves Present] : normal jugular venous V waves present [No Jugular Venous Wasserman A Waves] : no jugular venous wasserman A waves [Respiration, Rhythm And Depth] : normal respiratory rhythm and effort [Exaggerated Use Of Accessory Muscles For Inspiration] : no accessory muscle use [Auscultation Breath Sounds / Voice Sounds] : lungs were clear to auscultation bilaterally [Heart Rate And Rhythm] : heart rate and rhythm were normal [Heart Sounds] : normal S1 and S2 [Murmurs] : no murmurs present [Bradycardic ___] : the heart rate was bradycardic at [unfilled] bpm [Regular-Premature Beats] : the rhythm was regular with premature beats [Abdomen Soft] : soft [Abdomen Tenderness] : non-tender [Abdomen Mass (___ Cm)] : no abdominal mass palpated [Abnormal Walk] : normal gait [Gait - Sufficient For Exercise Testing] : the gait was sufficient for exercise testing [Nail Clubbing] : no clubbing of the fingernails [Cyanosis, Localized] : no localized cyanosis [Petechial Hemorrhages (___cm)] : no petechial hemorrhages [Skin Color & Pigmentation] : normal skin color and pigmentation [] : no rash [No Venous Stasis] : no venous stasis [Skin Lesions] : no skin lesions [No Skin Ulcers] : no skin ulcer [No Xanthoma] : no  xanthoma was observed [Oriented To Time, Place, And Person] : oriented to person, place, and time [Affect] : the affect was normal [Mood] : the mood was normal [FreeTextEntry1] : Seems less depressed, and a little anxious.

## 2023-11-08 NOTE — DISCHARGE NOTE NURSING/CASE MANAGEMENT/SOCIAL WORK - NSDCFUADDAPPT_GEN_ALL_CORE_FT
Please follow up with Dr. Mares on 11/22 at 10AM, call the office to make/confirm appointment at [phone]    Please follow up with Dr. Noel if needed for pain management    Please follow up with your primary care doctor

## 2023-11-08 NOTE — DISCHARGE NOTE PROVIDER - NSDCFUADDINST_GEN_ALL_CORE_FT
Neurosurgery follow up appointment date/time: 11/22 at 10AM  - follow up in the office for a wound check   - please call the office to confirm appointment: [phone]    Wound Care:  - you may shower tomorrow  - starting tomorrow, shower daily and let soapy water run down your neck  - gently clean incision with soapy water  - pat dry incision with a clean towel after showering   - leave incision uncovered, open to air   - no picking at incision    Devices:  - [place] J collar at all times      Activity:  - fatigue is common after surgery, rest if you feel tired   - no bending, lifting, twisting or heavy lifting   - walking is recommended, ambulate as tolerated  - you may shower when you get home, keep your incision dry  - no soaking in a tub/pool/hot tub   - no driving within 24 hours of anesthesia or while taking prescription pain medications   - keep hydrated, drink plenty of water     Please also follow up with your primary care doctor.     Pain Expectations:  - pain after surgery is expected  - please take pain meds as prescribed     Medications:  - continue home meds as prescribed: Dilantin  - pain meds:   Lyrica 50mg every 8 hours for nerve pain (WEAN OFF as tolerated, stopping abruptly can cause seizures. Can cause sleepiness)  Robaxin 500mg every 8 hours as needed for muscle spasm (can cause sleepiness)  Tylenol (over the counter) as needed for mild to moderate pain  Oxycodone 5mg every 6 hours as needed for severe pain (can cause sleepiness, constipation)   -  adverse affects of meds discussed with patients  - pain medications can cause constipation, you should eat a high fiber diet and may take a stool softener while on pain meds   - Avoid taking Advil (ibuprofen), Motrin (naproxen), or Aspirin for pain as they can cause bleeding     Call the office or come to ED if:  - wound has drainage or bleeding, increased redness or pain at incision site, neurological change, fever (>101), chills, night sweats, syncope, nausea/vomiting, chest pain, shortness of breath      Playback:  - see playback health for a copy of your discharge paperwork     WITHIN 24 HOURS OF DISCHARGE, PLEASE CONTACT NEURO PA  WITH ANY QUESTIONS OR CONCERNS: [phone]   OTHERWISE, PLEASE CALL THE OFFICE WITH ANY QUESTIONS OR CONCERNS: [phone]
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
## 2023-11-08 NOTE — PHYSICAL THERAPY INITIAL EVALUATION ADULT - CRITERIA FOR SKILLED THERAPEUTIC INTERVENTIONS
Spoke with pt regarding the UTI, pt has not been feeling well since Thanksgiving  Regarding her pelvic region, advised to be seen in urgent care for eval of UTI  Pt will go   anticipated discharge recommendation

## 2023-11-08 NOTE — PHYSICAL THERAPY INITIAL EVALUATION ADULT - GENERAL OBSERVATIONS, REHAB EVAL
As per neurosurgery GATO Campbell patient cleared for PT. Received sitting in a chair + tele, hemovac, heplock, [place] J collar, in NAD

## 2023-11-08 NOTE — DISCHARGE NOTE PROVIDER - NSDCCPCAREPLAN_GEN_ALL_CORE_FT
PRINCIPAL DISCHARGE DIAGNOSIS  Diagnosis: Cervical disc herniation  Assessment and Plan of Treatment: s/p 5-7 ACDF and C6 corpectomy on 11/7      SECONDARY DISCHARGE DIAGNOSES  Diagnosis: Epilepsy  Assessment and Plan of Treatment: continue home Dilantin     Initial (On Arrival)

## 2023-11-08 NOTE — PROCEDURE NOTE - GENERAL PROCEDURE DETAILS
dressing was removed ,Area was cleansed with chloro prep. suture was cut. catheter was removed. steri strip was applied.

## 2023-11-08 NOTE — DISCHARGE NOTE PROVIDER - NSDCMRMEDTOKEN_GEN_ALL_CORE_FT
acetaminophen 325 mg oral tablet: 2 tab(s) orally every 6 hours As needed Mild Pain (1 - 3)  Dilantin 100 mg oral capsule: orally 3 times a day  methocarbamol 500 mg oral tablet: 1 tab(s) orally every 8 hours as needed for Muscle Spasm MDD: 3 tabs  Narcan 4 mg/0.1 mL nasal spray: 4 milligram(s) intranasally once a day as needed for opioid overdose  oxyCODONE 5 mg oral tablet: 1 tab(s) orally every 6 hours as needed for Severe Pain (7 - 10) MDD: 4 tabs  polyethylene glycol 3350 oral powder for reconstitution: 17 gram(s) orally once a day as needed for  constipation  pregabalin 50 mg oral capsule: 1 cap(s) orally 3 times a day MDD: 3 tabs

## 2023-11-08 NOTE — PROGRESS NOTE ADULT - SUBJECTIVE AND OBJECTIVE BOX
Patient is a 43y old  Male who presents with a chief complaint of Cervical radiculopathy (06 Nov 2023 09:04)        SUBJECTIVE:  Patient was seen and examined at bedside.    Overnight Events :  resting in bed , no new complaints , awaiting surgery       Review of systems: 12 point Review of systems negative unless otherwise documented elsewhere in note.         MEDICATIONS:  MEDICATIONS  (STANDING):  ceFAZolin   IVPB 2000 milliGRAM(s) IV Intermittent every 8 hours  chlorhexidine 2% Cloths 1 Application(s) Topical every 12 hours  lactated ringers. 1000 milliLiter(s) (75 mL/Hr) IV Continuous <Continuous>  multivitamin 1 Tablet(s) Oral daily  ondansetron   Disintegrating Tablet 4 milliGRAM(s) Oral every 6 hours  phenytoin   Capsule 100 milliGRAM(s) Oral three times a day  polyethylene glycol 3350 17 Gram(s) Oral daily  pregabalin 50 milliGRAM(s) Oral three times a day  senna 2 Tablet(s) Oral at bedtime    MEDICATIONS  (PRN):  acetaminophen     Tablet .. 650 milliGRAM(s) Oral every 6 hours PRN Mild Pain (1 - 3)  bisacodyl 5 milliGRAM(s) Oral every 12 hours PRN Constipation  magnesium hydroxide Suspension 30 milliLiter(s) Oral every 12 hours PRN Constipation  methocarbamol 500 milliGRAM(s) Oral every 8 hours PRN Muscle Spasm  oxyCODONE    IR 5 milliGRAM(s) Oral every 4 hours PRN Severe Pain (7 - 10)      Allergies    No Known Allergies    Intolerances        OBJECTIVE:  Vital Signs Last 24 Hrs  T(C): 36.8 (08 Nov 2023 13:36), Max: 36.8 (07 Nov 2023 18:45)  T(F): 98.3 (08 Nov 2023 13:36), Max: 98.3 (07 Nov 2023 18:45)  HR: 97 (08 Nov 2023 13:36) (62 - 97)  BP: 126/73 (08 Nov 2023 13:36) (107/64 - 138/77)  BP(mean): 79 (07 Nov 2023 17:33) (78 - 86)  RR: 16 (08 Nov 2023 13:36) (12 - 18)  SpO2: 98% (08 Nov 2023 13:36) (97% - 100%)    Parameters below as of 08 Nov 2023 13:36  Patient On (Oxygen Delivery Method): room air        I&O's Summary      PHYSICAL EXAM:  Gen: Resting in bed at time of exam, not in distress   HEENT: moist mucosa, no lesions   Neck: supple, trachea at midline  CV: RRR, +S1/S2  Pulm: no wheezing , no crackles  no increase in work of breathing  Abd: soft, NTND  Skin: warm and dry, no new rashes   Ext: moving all 4 extremities spontaneously , no edema  ,  Neuro: AOx3, no gross focal neurological deficits  Psych: affect and behavior appropriate, pleasant at time of interview    LABS:                                   12.9   12.19 )-----------( 184      ( 08 Nov 2023 08:03 )             38.3     11-08    140  |  102  |  12  ----------------------------<  103<H>  4.4   |  27  |  0.62    Ca    9.6      08 Nov 2023 08:03  Phos  3.5     11-07  Mg     2.0     11-07           PTT - ( 07 Nov 2023 05:05 )  PTT:21.9 sec  CAPILLARY BLOOD GLUCOSE        Urinalysis Basic - ( 07 Nov 2023 05:05 )    Color: x / Appearance: x / SG: x / pH: x  Gluc: 119 mg/dL / Ketone: x  / Bili: x / Urobili: x   Blood: x / Protein: x / Nitrite: x   Leuk Esterase: x / RBC: x / WBC x   Sq Epi: x / Non Sq Epi: x / Bacteria: x        MICRODATA:      RADIOLOGY/OTHER STUDIES:

## 2023-11-13 DIAGNOSIS — M50.122 CERVICAL DISC DISORDER AT C5-C6 LEVEL WITH RADICULOPATHY: ICD-10-CM

## 2023-11-13 DIAGNOSIS — M48.02 SPINAL STENOSIS, CERVICAL REGION: ICD-10-CM

## 2023-11-13 DIAGNOSIS — M47.12 OTHER SPONDYLOSIS WITH MYELOPATHY, CERVICAL REGION: ICD-10-CM

## 2023-11-13 DIAGNOSIS — G40.909 EPILEPSY, UNSPECIFIED, NOT INTRACTABLE, WITHOUT STATUS EPILEPTICUS: ICD-10-CM

## 2023-11-13 DIAGNOSIS — M25.78 OSTEOPHYTE, VERTEBRAE: ICD-10-CM

## 2023-11-13 DIAGNOSIS — M47.22 OTHER SPONDYLOSIS WITH RADICULOPATHY, CERVICAL REGION: ICD-10-CM

## 2023-11-13 DIAGNOSIS — R26.81 UNSTEADINESS ON FEET: ICD-10-CM

## 2023-11-22 ENCOUNTER — APPOINTMENT (OUTPATIENT)
Dept: SPINE | Facility: CLINIC | Age: 43
End: 2023-11-22
Payer: COMMERCIAL

## 2023-11-22 VITALS
RESPIRATION RATE: 18 BRPM | OXYGEN SATURATION: 98 % | HEART RATE: 97 BPM | SYSTOLIC BLOOD PRESSURE: 123 MMHG | BODY MASS INDEX: 30.22 KG/M2 | DIASTOLIC BLOOD PRESSURE: 74 MMHG | WEIGHT: 188 LBS | TEMPERATURE: 98 F | HEIGHT: 66 IN

## 2023-11-22 PROCEDURE — 99024 POSTOP FOLLOW-UP VISIT: CPT

## 2023-12-13 ENCOUNTER — RESULT REVIEW (OUTPATIENT)
Age: 43
End: 2023-12-13
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
## 2023-12-13 NOTE — PHYSICAL EXAM
[Clean] : clean [General Appearance - Alert] : alert [Dry] : dry [Healing Well] : healing well [No Drainage] : without drainage [Oriented To Time, Place, And Person] : oriented to person, place, and time [Impaired Insight] : insight and judgment were intact [Person] : oriented to person [Place] : oriented to place [Time] : oriented to time [Limited Balance] : the patient's balance was impaired [] : no respiratory distress [Skin Color & Pigmentation] : normal skin color and pigmentation

## 2023-12-14 NOTE — PHYSICAL EXAM
[General Appearance - Alert] : alert [Clean] : clean [Dry] : dry [Healing Well] : healing well [No Drainage] : without drainage [Oriented To Time, Place, And Person] : oriented to person, place, and time [Impaired Insight] : insight and judgment were intact [Person] : oriented to person [Place] : oriented to place [Time] : oriented to time [Limited Balance] : the patient's balance was impaired [] : no respiratory distress [Skin Color & Pigmentation] : normal skin color and pigmentation

## 2023-12-20 ENCOUNTER — OUTPATIENT (OUTPATIENT)
Dept: OUTPATIENT SERVICES | Facility: HOSPITAL | Age: 43
LOS: 1 days | End: 2023-12-20
Payer: COMMERCIAL

## 2023-12-20 ENCOUNTER — APPOINTMENT (OUTPATIENT)
Dept: SPINE | Facility: CLINIC | Age: 43
End: 2023-12-20
Payer: COMMERCIAL

## 2023-12-20 VITALS
WEIGHT: 188 LBS | SYSTOLIC BLOOD PRESSURE: 118 MMHG | DIASTOLIC BLOOD PRESSURE: 72 MMHG | HEIGHT: 66 IN | TEMPERATURE: 98 F | OXYGEN SATURATION: 98 % | HEART RATE: 77 BPM | BODY MASS INDEX: 30.22 KG/M2 | RESPIRATION RATE: 18 BRPM

## 2023-12-20 DIAGNOSIS — R56.9 UNSPECIFIED CONVULSIONS: ICD-10-CM

## 2023-12-20 PROCEDURE — 99024 POSTOP FOLLOW-UP VISIT: CPT

## 2023-12-20 PROCEDURE — 72040 X-RAY EXAM NECK SPINE 2-3 VW: CPT | Mod: 26

## 2023-12-20 PROCEDURE — 72040 X-RAY EXAM NECK SPINE 2-3 VW: CPT

## 2023-12-20 RX ORDER — PHENYTOIN SODIUM 100 MG/1
100 CAPSULE ORAL 3 TIMES DAILY
Qty: 90 | Refills: 0 | Status: ACTIVE | COMMUNITY
Start: 2023-12-20 | End: 1900-01-01
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
## 2024-01-11 ENCOUNTER — EMERGENCY (EMERGENCY)
Facility: HOSPITAL | Age: 44
LOS: 1 days | Discharge: HOME | End: 2024-01-11
Admitting: STUDENT IN AN ORGANIZED HEALTH CARE EDUCATION/TRAINING PROGRAM
Payer: COMMERCIAL

## 2024-01-11 VITALS
DIASTOLIC BLOOD PRESSURE: 82 MMHG | HEART RATE: 86 BPM | SYSTOLIC BLOOD PRESSURE: 127 MMHG | WEIGHT: 188.05 LBS | OXYGEN SATURATION: 97 % | RESPIRATION RATE: 18 BRPM | HEIGHT: 65 IN | TEMPERATURE: 98 F

## 2024-01-11 DIAGNOSIS — M54.50 LOW BACK PAIN, UNSPECIFIED: ICD-10-CM

## 2024-01-11 DIAGNOSIS — Z98.890 OTHER SPECIFIED POSTPROCEDURAL STATES: Chronic | ICD-10-CM

## 2024-01-11 DIAGNOSIS — R20.2 PARESTHESIA OF SKIN: ICD-10-CM

## 2024-01-11 DIAGNOSIS — G40.909 EPILEPSY, UNSPECIFIED, NOT INTRACTABLE, WITHOUT STATUS EPILEPTICUS: ICD-10-CM

## 2024-01-11 PROCEDURE — 99283 EMERGENCY DEPT VISIT LOW MDM: CPT

## 2024-01-11 PROCEDURE — 99284 EMERGENCY DEPT VISIT MOD MDM: CPT

## 2024-01-11 RX ORDER — ACETAMINOPHEN 500 MG
2 TABLET ORAL
Qty: 40 | Refills: 0
Start: 2024-01-11

## 2024-01-11 RX ORDER — METHOCARBAMOL 500 MG/1
2 TABLET, FILM COATED ORAL
Qty: 15 | Refills: 0
Start: 2024-01-11

## 2024-01-11 RX ORDER — LIDOCAINE 4 G/100G
1 CREAM TOPICAL
Qty: 1 | Refills: 0
Start: 2024-01-11

## 2024-01-11 NOTE — ED ADULT NURSE NOTE - NSFALLUNIVINTERV_ED_ALL_ED
Bed/Stretcher in lowest position, wheels locked, appropriate side rails in place/Call bell, personal items and telephone in reach/Instruct patient to call for assistance before getting out of bed/chair/stretcher/Non-slip footwear applied when patient is off stretcher/[place] to call system/Physically safe environment - no spills, clutter or unnecessary equipment/Purposeful proactive rounding/Room/bathroom lighting operational, light cord in reach Bed/Stretcher in lowest position, wheels locked, appropriate side rails in place/Call bell, personal items and telephone in reach/Instruct patient to call for assistance before getting out of bed/chair/stretcher/Non-slip footwear applied when patient is off stretcher/[place] to call system/Physically safe environment - no spills, clutter or unnecessary equipment/Purposeful proactive rounding/Room/bathroom lighting operational, light cord in reach

## 2024-01-11 NOTE — ED PROVIDER NOTE - CARE PROVIDERS DIRECT ADDRESSES
,[email or web address]

## 2024-01-11 NOTE — ED ADULT TRIAGE NOTE - CHIEF COMPLAINT QUOTE
Pt aaox3 c/o right lower back pain worsening since yesterday. pt denies any recently falls, heavy lifting, or trauma.

## 2024-01-11 NOTE — ED PROVIDER NOTE - PATIENT PORTAL LINK FT
You can access the FollowMyHealth Patient Portal offered by [facility] by registering at the following website: [email or web address]. By joining [employer]’s FollowMyHealth portal, you will also be able to view your health information using other applications (apps) compatible with our system. You can access the FollowMyHealth Patient Portal offered by [facility] by registering at the following website: [email or web address]. By joining [employer]’s FollowMyHealth portal, you will also be able to view your health information using other applications (apps) compatible with our system.

## 2024-01-11 NOTE — ED PROVIDER NOTE - NSFOLLOWUPINSTRUCTIONS_ED_ALL_ED_FT
Take acetaminophen, lidocaine patch, and muscle relaxant as prescribed.  Follow up with Dr Osborn in 1-3 days.  =======================  Lumbosacral Strain  A lumbosacral strain is an injury that causes pain in the lower back (lumbosacral spine). This injury usually happens from overstretching the muscles or ligaments along the spine. Ligaments are cord-like tissues that connect bones to each other. A strain can affect one or more muscles or ligaments.    What are the causes?  This condition may be caused by:  A hard, direct hit to the back.  Overstretching the lower back muscles. This may result from:  A fall.  Lifting something heavy.  Repeated movements such as bending or crouching.  What increases the risk?  The following factors make you more likely to have a lumbosacral strain:  Taking part in sports or activities that involve:  A sudden twist of the back.  Pushing or pulling motions.  Being overweight or obese.  Having poor strength and flexibility, especially tight hamstrings or weak muscles in the back or abdomen.  Having too much of a curve in the lower back.  Having a pelvis that is tilted forward.  What are the signs or symptoms?  The main symptom of this condition is pain in the lower back, at the site of the strain. Pain may also be felt down one or both legs.    How is this diagnosed?  This condition is diagnosed based on:  Your symptoms and medical history.  A physical exam. During the exam:  Your health care provider may push on certain areas of your back to find the source of your pain.  You may be asked to bend forward, backward, and side to side to check your pain and range of motion.  You may also have imaging tests, such as X-rays and an MRI.    How is this treated?  This condition may be treated by:  Applying heat and cold to the affected area.  Taking medicines for pain and to relax your muscles.  Taking NSAIDs, such as ibuprofen, to help reduce swelling and discomfort.  Doing stretching and strengthening exercises for your lower back.  Symptoms usually improve within several weeks of treatment. But recovery time varies. When your symptoms improve, gradually return to your normal routine as soon as possible. This will help reduce pain, avoid stiffness, and keep muscle strength.    Follow these instructions at home:  Medicines    Take over-the-counter and prescription medicines only as told by your health care provider.  Ask your health care provider if the medicine prescribed to you:  Requires you to avoid driving or using machinery.  Can cause constipation. You may need to take these actions to prevent or treat constipation:  Drink enough fluid to keep your urine pale yellow.  Take over-the-counter or prescription medicines.  Eat foods that are high in fiber, such as beans, whole grains, and fresh fruits and vegetables.  Limit foods that are high in fat and processed sugars, such as fried or sweet foods.  Managing pain, stiffness, and swelling    Bag of ice on a towel on the skin.  A heating pad for use on the affected area.  If told, put ice on the injured area.  Put ice in a plastic bag.  Place a towel between your skin and the bag.  Leave the ice on for 20 minutes, 2–3 times a day.  If told, apply heat to the affected area as often as told by your health care provider. Use the heat source that your health care provider recommends, such as a moist heat pack or a heating pad.  Place a towel between your skin and the heat source.  Leave the heat on for 20–30 minutes.  If your skin turns bright red, remove the heat or ice right away to prevent skin damage. The risk of damage is higher if you cannot feel pain, heat, or cold.  Activity    Rest as told by your health care provider.  Do not stay in bed. Staying in bed for more than 1–2 days can delay your recovery.  Return to your normal activities as told by your health care provider. Ask your health care provider what activities are safe for you.  Avoid activities that take a lot of energy for as long as told by your health care provider.  Do exercises as told by your health care provider. This includes stretching and strengthening exercises.  General instructions    A person showing good posture while sitting at a desk and using a computer.  A person standing and ironing with one foot resting on a stable footstool to help keep the spine neutral.  Use good posture when sitting and standing. Avoid leaning forward when you sit, and avoid hunching over when you stand.  Do not use any products that contain nicotine or tobacco. These products include cigarettes, chewing tobacco, and vaping devices, such as e-cigarettes. If you need help quitting, ask your health care provider.  How is this prevented?  A person bending their knees and showing the correct posture to use when lifting a heavy object.  Warm up properly before physical activity, and cool down and stretch after being active.  Use correct form when playing sports. Bend your knees and use correct posture when lifting heavy objects.  Maintain a healthy weight.  Sleep on a mattress with medium firmness to support your back.  Do at least 150 minutes of moderate-intensity exercise each week, such as brisk walking or water aerobics. Try a form of exercise that takes stress off your back, such as swimming or stationary cycling.  Maintain physical fitness, including:  Strength.  Flexibility.  Contact a health care provider if:  Your back pain does not improve after several weeks of treatment.  Your symptoms get worse.  You have a fever.  Get help right away if:  Your back pain is severe.  You cannot stand or walk.  You feel nauseous or you vomit.  You develop any of the following:  Trouble controlling when you urinate or when you have a bowel movement.  Pain in your legs.  Your feet or legs get very cold, turn pale, or look blue.  Weakness in your buttocks or legs.  This information is not intended to replace advice given to you by your health care provider. Make sure you discuss any questions you have with your health care provider.

## 2024-01-11 NOTE — ED PROVIDER NOTE - OBJECTIVE STATEMENT
43-year-old male with a history of seizure disorder, status post cervical spine ACDF in November 2023, now complaining of low back pain x 2-3 days. 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  Admits to having some skin numbness to anterior right thigh for months, but no LE weakness or saddle anesthesia.     No fever/chills, no dysuria/hematuria/urinary retention/incontinence, fecal incontinence, hx of malignancy, hx of IVDU, saddle anesthesia, steroid use, unexplained weight loss.

## 2024-01-11 NOTE — ED PROVIDER NOTE - PHYSICAL EXAMINATION
CONSTITUTIONAL: NAD   SKIN: Normal color and turgor.    HEAD: NC/AT.  EYES: Conjunctiva clear. Anicteric sclera.  ENT: Airway clear. Normal voice.   RESPIRATORY:  Normal work of breathing. Lungs CTAB.  CARDIOVASCULAR:  RRR, S1S2. No M/R/G.      GI:  Abdomen soft, nontender.    MSK: Neck supple.  No LE edema or calf tenderness. No joint swelling or ROM limitation.  Strong symmetric UE and LE pulses, limbs well-perfused.  NEURO: Alert; CN: grossly intact. Speech clear.  5/5 strength bilat UE and LE. SILT.  No saddle anesthesia.  Patellar DTR intact.  Gait steady.

## 2024-01-11 NOTE — ED PROVIDER NOTE - CARE PROVIDER_API CALL
Manuelito Osborn  Neurosurgery  [address], [address] [place] [address]  Phone: [phone]  Fax: [phone]  Follow Up Time:    Manuelito Osborn  Neurosurgery  [address], [address] [place] [address]  Phone: [phone]  Fax: [phone]  Follow Up Time:

## 2024-01-11 NOTE — ED ADULT NURSE NOTE - OBJECTIVE STATEMENT
42 yo M PMHx seizures presents to the ED co worsening back pain x 1 days. Pt awake and alert, AOx4. Pt reports he feels a lump in his RLQ. Pt abdomen is firm upon palpation, but non tender. Pt reports his last BM was today. Pt also reports a severe aching pain in her R lower back, as well as tingling and numbness down his R leg. Pt denies loss of control of bowel / bladder. Pt able to ambulate. Of note, pt reports he has neck sx in november for a bulging disk. Pt denies urinary sx. Pt denies CP, SOB, N/V/D, f/c, ha, lightheadedness, dizziness, vision changes.

## 2024-01-11 NOTE — ED PROVIDER NOTE - CLINICAL SUMMARY MEDICAL DECISION MAKING FREE TEXT BOX
Few days lower back pain, likely strain.  No trauma or red flags for infectious causes (spinal/epidural abscess, pyelonephritis), no hx of steroid use/malignancy/unexplained wt loss to suggest pathologic fx, no acute neuro deficits or bladder/bowel dysfunction to suggest cord compression or cauda equina syndrome. No abd pain/chest pain to suggest aortic aneurysm/dissection as cause of pain. Had cervical spine surgery in early Nov 2023, do not suspect related to current complaints.  Will tx with muscle relaxants, analgesics; follow up with spine surgeon.

## 2024-01-17 ENCOUNTER — APPOINTMENT (OUTPATIENT)
Dept: SPINE | Facility: CLINIC | Age: 44
End: 2024-01-17
Payer: COMMERCIAL

## 2024-01-17 VITALS
TEMPERATURE: 97.6 F | RESPIRATION RATE: 18 BRPM | HEIGHT: 65 IN | SYSTOLIC BLOOD PRESSURE: 137 MMHG | HEART RATE: 84 BPM | DIASTOLIC BLOOD PRESSURE: 74 MMHG | WEIGHT: 188 LBS | BODY MASS INDEX: 31.32 KG/M2 | OXYGEN SATURATION: 98 %

## 2024-01-17 PROCEDURE — 99213 OFFICE O/P EST LOW 20 MIN: CPT | Mod: 24

## 2024-01-17 RX ORDER — PHENYTOIN SODIUM 100 MG/1
100 CAPSULE ORAL 3 TIMES DAILY
Qty: 90 | Refills: 0 | Status: ACTIVE | COMMUNITY
Start: 2024-01-17 | End: 1900-01-01

## 2024-01-19 NOTE — ADDENDUM
[FreeTextEntry1] : The patient is a 43-year-old gentleman who is approximately 3-month status post anterior cervical corpectomy.  He states that his neck pain has resolved and that his upper extremity radicular symptoms have resolved.  He states that he still has significant difficulty ambulating.  He also complains of pain radiating from the right side of his lower back through his right lower extremity.  On physical examination he has notable weakness in the right lower extremity with 2 out of 5 strength in dorsiflexion and the extensor hallucis longus.  Given the patient's symptoms and lack of response to physical therapy, I feel that we should obtain a new MRI scan of both the cervical and lumbar spine in order to evaluate for any pathology that may be hindering his recovery.  Once he has these imaging studies completed, he should return to the office for discussion of possible interventions that may be helpful in alleviating his symptoms.
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
## 2024-01-26 ENCOUNTER — APPOINTMENT (OUTPATIENT)
Dept: MRI IMAGING | Facility: CLINIC | Age: 44
End: 2024-01-26
Payer: COMMERCIAL

## 2024-01-26 PROCEDURE — 72148 MRI LUMBAR SPINE W/O DYE: CPT

## 2024-01-26 PROCEDURE — 72141 MRI NECK SPINE W/O DYE: CPT

## 2024-02-07 ENCOUNTER — APPOINTMENT (OUTPATIENT)
Dept: SPINE | Facility: CLINIC | Age: 44
End: 2024-02-07
Payer: COMMERCIAL

## 2024-02-07 VITALS
SYSTOLIC BLOOD PRESSURE: 112 MMHG | WEIGHT: 188 LBS | HEIGHT: 65 IN | BODY MASS INDEX: 31.32 KG/M2 | OXYGEN SATURATION: 98 % | DIASTOLIC BLOOD PRESSURE: 71 MMHG | RESPIRATION RATE: 18 BRPM | HEART RATE: 89 BPM

## 2024-02-07 DIAGNOSIS — M54.50 LOW BACK PAIN, UNSPECIFIED: ICD-10-CM

## 2024-02-07 PROCEDURE — 99213 OFFICE O/P EST LOW 20 MIN: CPT
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
## 2024-02-09 NOTE — ADDENDUM
[FreeTextEntry1] : Patient with continued gait imbalance and worsening low back pain and radiculopathy.  MRIs show no surgical pathology.  Referral given for pain management  Follow up in three months.

## 2024-02-09 NOTE — ASSESSMENT
[FreeTextEntry1] : Plan:  Pain Management for trial of injections. Follow up in 3 months once has tried injection for lumbar spine.

## 2024-02-09 NOTE — DATA REVIEWED
[de-identified] : EXAM: [id number] - MR SPINE CERVICAL  - ORDERED BY: RAGHAV CABA   PROCEDURE DATE:  01/26/2024    INTERPRETATION:  CERVICAL SPINE MRI  CLINICAL INFORMATION: Prior cervical fusion with neck pain and balance issues  TECHNIQUE: Multiplanar, multisequence MRI was obtained of the cervical spine. Comparison is made to prior study from 10/4/2023  FINDINGS:  CERVICAL LEVEL EVALUATION:  C1/2: Mild arthrosis between the dens and the anterior arch of C1. C2/C3: Normal C3/C4: Small to moderate central disc protrusion that indents the ventral thecal sac and nearly contacts the cord. C4/C5: Small to moderate broad-based posterior disc protrusion that indents the ventral thecal sac and nearly contacts the ventral aspect of the cord.  The patient is status post C6 corpectomy with anterior fusion from C5 through C7 with plate and screw fixation. There is associated susceptibility artifact. There is a 6 mm x 2 mm x 11 mm region of T2 bright signal within the cord at the C6-C7 level as seen on preoperative MRI from 10/4/2023 consistent with myelomalacia. At the C5-C6 level there appears to be small amount of residual disc material and osteophyte indenting the ventral thecal sac and nearly contacting the cord.  C7/T1: Normal  ALIGNMENT: Straightening of the normal cervical lordosis. CORD: T2 signal abnormality at C6-C7 level as described above consistent with myomalacia in similar in appearance to the preoperative MRI from 10/4/2023. Small linear region of T2 prolongation just superior to this level on the left that is also unchanged. MARROW: No bone marrow edema or fracture. IMAGED BRAIN: Normal PERIPHERAL/NECK SOFT TISSUES: Normal  IMPRESSION: Anterior cervical fusion from C5 through C7 with corpectomy at C6.  Small to moderate posterior disc protrusion at C3-C4 and C4-C5 and nearly contacts the ventral aspect of the cord and are very slightly larger than on the preoperative exam from 10/4/2023.  Unchanged cord signal abnormality at the C6 and C7 levels consistent with myelomalacia   EXAM: [id number] - MR SPINE LUMBAR  - ORDERED BY: RAGHAV CABA   PROCEDURE DATE:  01/26/2024    INTERPRETATION:  CLINICAL INFORMATION: Low back pain  ADDITIONAL CLINICAL INFORMATION: Not Applicable  TECHNIQUE: Multiplanar, multisequence MRI was performed of the lumbar spine. IV Contrast: NONE  PRIOR STUDIES: No priors available for comparison.  FINDINGS:  LOCALIZER: No additional findings. BONES: There is no fracture or bone marrow edema. ALIGNMENT: The alignment is normal. SACROILIAC JOINTS/SACRUM: There is no sacral fracture. The SI joints are partially visualized but are intact. CONUS AND CAUDA EQUINA: The distal cord and conus are normal in signal. Conus terminates at L1. VISUALIZED INTRAPELVIC/INTRA-ABDOMINAL SOFT TISSUES: Normal. PARASPINAL SOFT TISSUES: Normal.   INDIVIDUAL LEVELS:  There is a mild degree developmental stenosis throughout the lumbar spine  LOWER THORACIC SPINE: No spinal canal or neuroforaminal stenosis.  LUMBAR SPINE: Diffuse disc degeneration is present at L5-S1 with mild to moderate disc bulging and small endplate osteophyte formation. Mild facet arthrosis. The combination of these findings results in moderate right and moderate to severe left foraminal narrowing. Mild lateral recess stenosis is noted bilaterally. The remaining lumbar discs are intact.  IMPRESSION:  Diffuse disc degeneration is present at L5-S1 with mild to moderate disc bulging and small endplate osteophyte formation. Mild facet arthrosis. The combination of these findings results in moderate right and moderate to severe left foraminal narrowing. Mild lateral recess stenosis is noted bilaterally.  Mild degree of development stenosis within the remainder of the lumbar spine.

## 2024-05-09 PROBLEM — Z98.1 S/P CERVICAL SPINAL FUSION: Status: ACTIVE | Noted: 2023-11-15

## 2024-05-09 PROBLEM — G95.9 CERVICAL MYELOPATHY WITH CERVICAL RADICULOPATHY: Status: ACTIVE | Noted: 2023-11-15

## 2024-05-16 ENCOUNTER — RESULT REVIEW (OUTPATIENT)
Age: 44
End: 2024-05-16

## 2024-05-16 ENCOUNTER — OUTPATIENT (OUTPATIENT)
Dept: OUTPATIENT SERVICES | Facility: HOSPITAL | Age: 44
LOS: 1 days | End: 2024-05-16
Payer: COMMERCIAL

## 2024-05-16 ENCOUNTER — APPOINTMENT (OUTPATIENT)
Dept: SPINE | Facility: CLINIC | Age: 44
End: 2024-05-16
Payer: COMMERCIAL

## 2024-05-16 VITALS
DIASTOLIC BLOOD PRESSURE: 70 MMHG | SYSTOLIC BLOOD PRESSURE: 110 MMHG | HEIGHT: 65 IN | HEART RATE: 85 BPM | WEIGHT: 188 LBS | OXYGEN SATURATION: 98 % | BODY MASS INDEX: 31.32 KG/M2 | RESPIRATION RATE: 18 BRPM

## 2024-05-16 DIAGNOSIS — Z98.890 OTHER SPECIFIED POSTPROCEDURAL STATES: Chronic | ICD-10-CM

## 2024-05-16 DIAGNOSIS — G95.9 DISEASE OF SPINAL CORD, UNSPECIFIED: ICD-10-CM

## 2024-05-16 DIAGNOSIS — Z98.1 ARTHRODESIS STATUS: ICD-10-CM

## 2024-05-16 DIAGNOSIS — M54.12 DISEASE OF SPINAL CORD, UNSPECIFIED: ICD-10-CM

## 2024-05-16 PROCEDURE — 72052 X-RAY EXAM NECK SPINE 6/>VWS: CPT | Mod: 26

## 2024-05-16 PROCEDURE — 72052 X-RAY EXAM NECK SPINE 6/>VWS: CPT

## 2024-05-16 PROCEDURE — 99213 OFFICE O/P EST LOW 20 MIN: CPT

## 2024-05-17 NOTE — ADDENDUM
[FreeTextEntry1] : The patient is doing well status post C6 corpectomy for severe cord compression and myelopathy.  He denies neck pain and has minor upper extremity numbness that is improving.  He continues to have low back pain and lower extremity radicular symptoms but is being treated by pain management.  The patient has x-rays of his cervical spine available for review today that show evidence of fusion across his corpectomy site.  There is no evidence of malalignment or instrumentation failure.  At this point, I recommend the patient continue with physical therapy and to follow-up with his pain management for treatment of his low back and lower extremity radicular pain.  He will follow-up in 1 year as per routine.  He knows to call the office if he should have any new questions, concerns or complaints.
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
## 2025-03-24 ENCOUNTER — NON-APPOINTMENT (OUTPATIENT)
Age: 45
End: 2025-03-24

## 2025-05-14 ENCOUNTER — RESULT REVIEW (OUTPATIENT)
Age: 45
End: 2025-05-14

## 2025-05-14 ENCOUNTER — APPOINTMENT (OUTPATIENT)
Dept: SPINE | Facility: CLINIC | Age: 45
End: 2025-05-14
Payer: MEDICAID

## 2025-05-14 ENCOUNTER — OUTPATIENT (OUTPATIENT)
Dept: OUTPATIENT SERVICES | Facility: HOSPITAL | Age: 45
LOS: 1 days | End: 2025-05-14
Payer: MEDICAID

## 2025-05-14 VITALS
OXYGEN SATURATION: 99 % | RESPIRATION RATE: 18 BRPM | SYSTOLIC BLOOD PRESSURE: 113 MMHG | HEART RATE: 98 BPM | WEIGHT: 192 LBS | BODY MASS INDEX: 28.44 KG/M2 | DIASTOLIC BLOOD PRESSURE: 77 MMHG | HEIGHT: 69 IN | TEMPERATURE: 97.5 F

## 2025-05-14 DIAGNOSIS — Z98.890 OTHER SPECIFIED POSTPROCEDURAL STATES: Chronic | ICD-10-CM

## 2025-05-14 DIAGNOSIS — Z98.1 ARTHRODESIS STATUS: ICD-10-CM

## 2025-05-14 PROCEDURE — 99213 OFFICE O/P EST LOW 20 MIN: CPT

## 2025-05-14 PROCEDURE — 72052 X-RAY EXAM NECK SPINE 6/>VWS: CPT | Mod: 26

## 2025-05-14 PROCEDURE — 72052 X-RAY EXAM NECK SPINE 6/>VWS: CPT

## (undated) DEVICE — DRAPE TOWEL BLUE 17" X 24"

## (undated) DEVICE — DRAPE MICROSCOPE LEICA 26" X 150"

## (undated) DEVICE — GLV 8 PROTEXIS (WHITE)

## (undated) DEVICE — WARMING BLANKET LOWER ADULT

## (undated) DEVICE — SPONGE SURGICAL STRIP 1/2 X 6"

## (undated) DEVICE — GLV 7.5 PROTEXIS (WHITE)

## (undated) DEVICE — BUR STRYKER CARBIDE MATCH HEAD 3MM

## (undated) DEVICE — DRAPE FOR 2 TIER TABLE W/ 8" BACK 72" LONG

## (undated) DEVICE — ELCTR STRYKER NEPTUNE SMOKE EVACUATION PENCIL (GREEN)

## (undated) DEVICE — PREP CHLORAPREP HI-LITE ORANGE 26ML

## (undated) DEVICE — DRAPE MICROSCOPE EXOSCOPE 12" X 79"

## (undated) DEVICE — BIPOLAR FORCEP SYMMETRY BAYONET STR 8.25" X 1.5MM (BLUE)

## (undated) DEVICE — SUT VICRYL 3-0 27" SH

## (undated) DEVICE — MARKING PEN W RULER

## (undated) DEVICE — BUR STRYKER MULTI FLUTE ROUND 2MM

## (undated) DEVICE — DRAPE THYROID 77" X 123"

## (undated) DEVICE — VENODYNE/SCD SLEEVE CALF MEDIUM

## (undated) DEVICE — SUT MONOCRYL 4-0 27" PS-2 UNDYED

## (undated) DEVICE — PACK SPINE

## (undated) DEVICE — MIDAS REX MR8 MATCH HEAD FLUTED SM BORE 3MM X 10CM

## (undated) DEVICE — SUT MONOCRYL 3-0 27" PS-2 UNDYED

## (undated) DEVICE — TAPE SILK 3"

## (undated) DEVICE — MIDAS REX MR8 BALL FLUTED SM BORE 2MM X 10CM